# Patient Record
Sex: MALE | Race: WHITE | Employment: FULL TIME | URBAN - METROPOLITAN AREA
[De-identification: names, ages, dates, MRNs, and addresses within clinical notes are randomized per-mention and may not be internally consistent; named-entity substitution may affect disease eponyms.]

---

## 2020-11-18 ENCOUNTER — TRANSCRIBE ORDER (OUTPATIENT)
Dept: SCHEDULING | Age: 40
End: 2020-11-18

## 2020-11-18 DIAGNOSIS — R79.89 ABNORMAL LFTS: Primary | ICD-10-CM

## 2020-12-16 ENCOUNTER — OFFICE VISIT (OUTPATIENT)
Dept: HEMATOLOGY | Age: 40
End: 2020-12-16
Payer: COMMERCIAL

## 2020-12-16 VITALS
HEIGHT: 72 IN | BODY MASS INDEX: 28.28 KG/M2 | HEART RATE: 67 BPM | WEIGHT: 208.8 LBS | OXYGEN SATURATION: 98 % | DIASTOLIC BLOOD PRESSURE: 70 MMHG | TEMPERATURE: 96.8 F | SYSTOLIC BLOOD PRESSURE: 117 MMHG | RESPIRATION RATE: 16 BRPM

## 2020-12-16 DIAGNOSIS — K74.69 OTHER CIRRHOSIS OF LIVER (HCC): Primary | ICD-10-CM

## 2020-12-16 PROCEDURE — 99202 OFFICE O/P NEW SF 15 MIN: CPT | Performed by: PHYSICIAN ASSISTANT

## 2020-12-16 PROCEDURE — 91200 LIVER ELASTOGRAPHY: CPT | Performed by: PHYSICIAN ASSISTANT

## 2020-12-16 NOTE — PROGRESS NOTES
Robbie Prader, MD, Sheba Purdy MD, MPH      Nicole Tam, DOUGLAS Mckinney, ACNP-BC     April S Vera, PCNP-BC   Nikhil Gomez, FNP-C    Lani Theodore, [facility]-BC       [facility] [address]    at [address], [facility] Surya Menard  22.    [phone]    FAX: [address] [place] [address] Avenue    [facility]    [address], [address]    [address] [facility], [address]    [phone]    FAX: [phone]       Patient Care Team:  Unknown, Provider as PCP - Devin Olivares MD (Gastroenterology)  Problem List  Never Reviewed          Codes Class Noted    Other cirrhosis of liver [facility]) ICD-10-CM: K74.69  ICD-9-CM: 571.5  12/16/2020            The physicians listed above have asked me to see Doug Maksim in consultation regarding alcohol induced liver injury and to perform assessment of fibrosis by means of in-office fibroscan analysis. The patient is a 36 y.o.  male who was found to have liver disease in 11/2020 when he presented with jaundice. HBV and HCV reportedly negative by record review. The patient has the following symptoms which are thought to be due to the liver disease:    fatigue, pain in the right side over the liver, diffuse abdominal pain, yellowing of the eyes or skin,and dark urine. The patient completes all daily activities without any functional limitations. ALLERGIES:  No Known Allergies    MEDICATIONS:  No current outpatient medications on file. No current facility-administered medications for this visit. SYSTEM REVIEW NOT RELATED TO LIVER DISEASE OR REVIEWED ABOVE:  Constitution systems: Negative for fever, chills, weight gain, weight loss. Eyes: Negative for visual changes.   ENT: Negative for sore throat, painful swallowing. Respiratory: Negative for cough, hemoptysis, SOB. Cardiology: Negative for chest pain, palpitations. GI:  Negative for constipation or diarrhea. : Negative for urinary frequency, dysuria, hematuria, nocturia. Skin: Negative for rash. Hematology: Negative for easy bruising, blood clots. Musculo-skeletal: Negative for back pain, muscle pain, weakness. Neurologic: Negative for headaches, dizziness, vertigo, memory problems not related to HE. Psychology: Negative for anxiety, depression. FAMILY HISTORY:  The father is alive and well. The mother has multiple medical issues. The MGF was known to have liver problem thought to be related to alcohol. SOCIAL HISTORY:  The patient is single. The patient has children. The patient has never used tobacco products. The patient consumes 2-3 alcoholic beverages per day. The patient has been abstinent from alcohol since 11/2020. The patient currently works full time as owner of several area restaurants. PHYSICAL EXAMINATION:  Visit Vitals  /70 (BP 1 Location: Right arm, BP Patient Position: Sitting)   Pulse 67   Temp 96.8 °F (36 °C) (Temporal)   Resp 16   Ht 6' (1.829 m)   Wt 208 lb 12.8 oz (94.7 kg)   SpO2 98%   BMI 28.32 kg/m²     General: No acute distress. Skin: No spider angiomata. No jaundice. No palmar erythema. Abdomen: Soft non-tender. Liver edge firm and palpable below level of umbilicus. Tender with palpation. Spleen not palpable. No obvious ascites. Extremities: No edema. No muscle wasting. No gross arthritic changes. Neurologic: Alert and oriented. Cranial nerves grossly intact. No asterixis. LABORATORY STUDIES:  Recent liver function panel, CBC with platelet count and BMP are not available. SEROLOGIES:  Not available or performed. Testing will be performed as indicated. LIVER HISTOLOGY:  12/2020. FibroScan performed at [facility]. EkPa was 75. IQR/med 3%. . The results suggested a fibrosis level of F4. The CAP score suggests no evidence of fatty liver. ENDOSCOPIC PROCEDURES:  Not available or performed    RADIOLOGY:  Not available, performed 12/2020. OTHER TESTING:  Not available or performed    ASSESSMENT AND PLAN:  Cryptogenic cirrhosis. Assessment of fibrosis was made through performance of fibroscan in the office today. The results of the analysis were shared with the patient in the office at the time of the procedure. This may be all due to alcohol injury by patient history, but there is low score for steatosis. Autoimmune, viral and inherited forms of liver disease should also be ruled out. A copy of the report was provided to the patient and will be forwarded to the referring medical practice. All questions were answered. The patient expressed a clear understanding of the above. FOLLOW-UP:  All of the issues listed above in the Assessment and Plan were discussed with the patient. All questions were answered. The patient expressed a clear understanding of the above. The patient will follow-up with the referring physician.     Jamar Clay PA-C  Liver [place] [facility] 59, [address] [facility] Surya Luong  22.  [phone]  [address]

## 2020-12-16 NOTE — PROGRESS NOTES
Identified pt with two pt identifiers(name and ). Reviewed record in preparation for visit and have obtained necessary documentation. Chief Complaint   Patient presents with    Other     Fibroscan Only      Vitals:    20 0914   BP: 117/70   Pulse: 67   Resp: 16   Temp: 96.8 °F (36 °C)   TempSrc: Temporal   SpO2: 98%   Weight: 208 lb 12.8 oz (94.7 kg)   Height: 6' (1.829 m)   PainSc:   0 - No pain       Health Maintenance Review: Patient reminded of \"due or due soon\" health maintenance. I have asked the patient to contact his/her primary care provider (PCP) for follow-up on his/her health maintenance. Coordination of Care Questionnaire:  :   1) Have you been to an emergency room, urgent care, or hospitalized since your last visit? If yes, where when, and reason for visit? no       2. Have seen or consulted any other health care provider since your last visit? If yes, where when, and reason for visit? YES, Dr Jori Marcelino      Patient is accompanied by self I have received verbal consent from Cristofer Owen to discuss any/all medical information while they are present in the room.